# Patient Record
Sex: FEMALE | Race: WHITE
[De-identification: names, ages, dates, MRNs, and addresses within clinical notes are randomized per-mention and may not be internally consistent; named-entity substitution may affect disease eponyms.]

---

## 2020-07-30 ENCOUNTER — HOSPITAL ENCOUNTER (OUTPATIENT)
Dept: HOSPITAL 75 - RAD | Age: 20
End: 2020-07-30
Attending: INTERNAL MEDICINE
Payer: COMMERCIAL

## 2020-07-30 DIAGNOSIS — K59.00: ICD-10-CM

## 2020-07-30 DIAGNOSIS — R10.9: ICD-10-CM

## 2020-07-30 DIAGNOSIS — R19.7: Primary | ICD-10-CM

## 2020-07-30 PROCEDURE — 76705 ECHO EXAM OF ABDOMEN: CPT

## 2021-12-02 ENCOUNTER — HOSPITAL ENCOUNTER (OUTPATIENT)
Dept: HOSPITAL 75 - RAD | Age: 21
End: 2021-12-02
Attending: INTERNAL MEDICINE
Payer: COMMERCIAL

## 2021-12-02 DIAGNOSIS — E04.1: Primary | ICD-10-CM

## 2021-12-02 PROCEDURE — 76536 US EXAM OF HEAD AND NECK: CPT

## 2021-12-02 NOTE — DIAGNOSTIC IMAGING REPORT
PROCEDURE: US Thyroid.



TECHNIQUE: Multiple real-time grayscale images were obtained of

the thyroid in various projections.



INDICATION: Thyroid nodule.



FINDINGS: Right lobe of the thyroid measures 3.6 x 1.1 x 0.8 cm,

and left lobe measures 3.8 x 0.9 x 1.2 cm. Isthmus is 2 mm in

thickness. Both lobes of the thyroid show some parenchymal

heterogeneity, but no discrete thyroid mass is identified.



IMPRESSION: Thyroid heterogeneity. No thyroid mass is detected.



Dictated by: 



  Dictated on workstation # [id number]

## 2021-12-15 ENCOUNTER — HOSPITAL ENCOUNTER (OUTPATIENT)
Dept: HOSPITAL 75 - PREOP | Age: 21
Discharge: HOME | End: 2021-12-15
Attending: SURGERY
Payer: COMMERCIAL

## 2021-12-15 VITALS — HEIGHT: 67.01 IN | BODY MASS INDEX: 29.34 KG/M2 | WEIGHT: 186.95 LBS

## 2021-12-15 DIAGNOSIS — Z01.818: Primary | ICD-10-CM

## 2021-12-17 ENCOUNTER — HOSPITAL ENCOUNTER (OUTPATIENT)
Dept: HOSPITAL 75 - ENDO | Age: 21
End: 2021-12-17
Attending: SURGERY
Payer: COMMERCIAL

## 2021-12-17 VITALS — SYSTOLIC BLOOD PRESSURE: 136 MMHG | DIASTOLIC BLOOD PRESSURE: 78 MMHG

## 2021-12-17 VITALS — BODY MASS INDEX: 29.34 KG/M2 | WEIGHT: 186.95 LBS | HEIGHT: 67.01 IN

## 2021-12-17 VITALS — SYSTOLIC BLOOD PRESSURE: 144 MMHG | DIASTOLIC BLOOD PRESSURE: 89 MMHG

## 2021-12-17 VITALS — DIASTOLIC BLOOD PRESSURE: 96 MMHG | SYSTOLIC BLOOD PRESSURE: 159 MMHG

## 2021-12-17 VITALS — DIASTOLIC BLOOD PRESSURE: 86 MMHG | SYSTOLIC BLOOD PRESSURE: 135 MMHG

## 2021-12-17 VITALS — SYSTOLIC BLOOD PRESSURE: 139 MMHG | DIASTOLIC BLOOD PRESSURE: 88 MMHG

## 2021-12-17 DIAGNOSIS — K44.9: ICD-10-CM

## 2021-12-17 DIAGNOSIS — Z79.899: ICD-10-CM

## 2021-12-17 DIAGNOSIS — K29.40: Primary | ICD-10-CM

## 2021-12-17 DIAGNOSIS — K25.9: ICD-10-CM

## 2021-12-17 DIAGNOSIS — K21.00: ICD-10-CM

## 2021-12-17 PROCEDURE — 84703 CHORIONIC GONADOTROPIN ASSAY: CPT

## 2021-12-17 NOTE — DISCHARGE INST-SIMPLE/STANDARD
Discharge Inst-Standard


Patient Instructions/Follow Up


Plan of Care/Instructions/FU:  


2 weeks chantelle


Activity as Tolerated:  Yes


Discharge Diet:  Regular Diet











KRISTIN HOWARD DO              Dec 17, 2021 15:45

## 2021-12-17 NOTE — OPERATIVE REPORT
DATE OF SERVICE:  12/17/2021



PREOPERATIVE DIAGNOSIS:

Chronic gastritis.



POSTOPERATIVE DIAGNOSES:

Slight gastritis in the antrum, small hiatal hernia.



PROCEDURE:

EGD with biopsy.



SURGEON:

Kristin Barr DO



ANESTHESIA:

Per CRNA.



ESTIMATED BLOOD LOSS:

None.



COMPLICATIONS:

None.



SPECIMENS:

Antrum, GE junction.



INDICATIONS:

The patient is a 21-year-old female with chronic gastritis symptoms.  She has

history of atrophic gastritis.  She understands risks and benefits of procedure

and wishes to proceed.  Consent was signed in the chart.



DESCRIPTION OF PROCEDURE:

The patient was taken to the endoscopy suite, placed in left lateral recumbent

position.  Timeout was performed.  Scope was inserted in mouth, down the

esophagus, stomach and into the duodenum without difficulty.  There were no

polyps, masses or ulcerations within the duodenum.  Scope was slowly retracted

back into the stomach where it was further insufflated.  Patchy areas of

gastritis appearance in the antrum.  Biopsies were obtained.  Scope was

retroflexed noting a small hiatal hernia, no other pathology.  Scope was

returned to its normal position, slowly withdrawn to distal esophagus.  No

polyps, masses or ulcerations.  Biopsy of the GE junction was obtained.  Scope

was slowly retracted back until completely removed, noting no other pathology. 

The patient tolerated procedure well without any complications.  She was taken

to recovery room in stable condition.



RECOMMENDATIONS:

Continue on current medications.  She will follow up in office in two weeks to

discuss pathology results.  Likely repeat EGD in one year from her previous

recommendations from her previous gastroenterologist.





Job ID: [id number]

DocumentID: [id number]

Dictated Date:  12/17/2021 15:35:56

Transcription Date: 12/17/2021 20:45:05

Dictated By: KRISTIN BARR DO

## 2021-12-17 NOTE — PROGRESS NOTE-POST OPERATIVE
Post-Operative Progess Note


Surgeon (s)/Assistant (s)


Surgeon


KRISTIN HOWARD DO


Assistant:  na





Pre-Operative Diagnosis


chronic gastritis





Post-Operative Diagnosis





gastritis changes of antrum, small hiatal hernia





Procedure & Operative Findings


Date of Procedure


12/17/21


Procedure Performed/Findings


egd c biopsies


Anesthesia Type


per crna





Estimated Blood Loss


Estimated blood loss (mL):  none





Specimens/Packing


Specimens Removed


antrum, ge











KRISTIN HOWARD DO              Dec 17, 2021 15:09

## 2021-12-17 NOTE — PROGRESS NOTE-PRE OPERATIVE
Pre-Operative Progress Note


H&P Reviewed


The H&P was reviewed, patient examined and no changes noted.


Date Seen by Provider:  Dec 17, 2021


Time Seen by Provider:  14:48


Date H&P Reviewed:  Dec 17, 2021


Time H&P Reviewed:  14:48


Pre-Operative Diagnosis:  chronic gastritis











KRISTIN HOWARD DO              Dec 17, 2021 14:49

## 2021-12-17 NOTE — XMS REPORT
Clinical Summary

                             Created on: 2021



Jaclyn Ecsobedo

External Reference #: [id number]

: 2000

Sex: Female



Demographics





                          Address                   [address] [place]  [address]

 

                          Home Phone                [phone]

 

                          Preferred Language        Unknown

 

                          Marital Status            Unknown

 

                          Cheondoism Affiliation     Unknown

 

                          Race                      Unknown

 

                          Ethnic Group              Unknown





Author





                          Author                    [facility]

 

                          Organization              [facility]

 

                          Address                   Unknown

 

                          Phone                     Unavailable







Care Team Providers





                    Care Team Member Name Role                Phone

 

                    Jessy Martínez MD PCP                 [phone]







Allergies

No known active allergies



Medications





                          End Date                  Status



              Medication   Sig          Dispensed    Refills      Start  



                                         Date  

 

                                                    Active



                     ibuprofen (ADVIL,MOTRIN)    0                     



                           800 MG tablet             8  

 

                                                    Active



                     LO LOESTRIN FE 1 mg-10    0                     



                           mcg (24)/10 mcg (2) tab     8  

 

                                                    Active



                     NP THYROID 90 mg tab    0                     



                                         8  







Active Problems





Not on file



Encounters

Not on filefrom Last 3 Months



Immunizations

Not on file



Social History





                                        Date



                 Tobacco Use     Types           Packs/Day       Years Used 

 

                                         



                                         Never Smoker    

 

    



                                         Smokeless Tobacco: Never   



                                         Used   







                                        Tobacco Cessation: Counseling Given: Yes









 



                           Sex Assigned at Birth     Date Recorded

 

 



                                         Not on file 







Last Filed Vital Signs





                    Reading             Time Taken          Comments



                                         Vital Sign   

 

                    120/78              2018 11:36 AM CDT  



                                         Blood Pressure   

 

                    82                  2018 11:36 AM CDT  



                                         Pulse   

 

                    37.1 C (98.7 F) 2018 11:36 AM CDT  



                                         Temperature   

 

                    16                  2018 11:36 AM CDT  



                                         Respiratory Rate   

 

                    98%                 2018 11:36 AM CDT  



                                         Oxygen Saturation   

 

                    -                   -                    



                                         Inhaled Oxygen   



                                         Concentration   

 

                    83.4 kg (183 lb 13.8 oz) 2018 11:36 AM CDT  



                                         Weight   

 

                    167.6 cm (5' 6")    2018 11:36 AM CDT  



                                         Height   

 

                    29.68               2018 11:36 AM CDT  



                                         Body Mass Index   







Plan of Treatment





   



                 Health Maintenance  Due Date        Last Done       Comments

 

   



                           Cervical Cancer:          2019  



                                         Screening   







Goals

Not on file



Implants

Not on file



Procedures

Not on filefrom Last 3 Months



Results

Not on filefrom Last 3 Months



Additional Health Concerns

Not on file



Insurance





                                        Type



            Payer      Benefit    Subscriber ID  Effective  Phone      Address 



                           Plan /                    Dates   



                                         Group     

 

                                         



                 [facility]             1               [id number]       2018-P   



                           [employer]                resent   



                                         [facility] [place]     



                                         [id number]     







     



            Guarantor Name  Account    Relation to  Date of    Phone      Billin

g Address



                     Type                Patient             Birth  

 

     



            Jaclyn Escobedo  Personal/F  Self       2000  [phone]  1

[address]               (Home)              [place] [id number]







Care Teams





                          Start Date                End Date



                     Team Member         Relationship        Specialty  

 

                          18                    



                     Jessy Martínez MD  PCP - General       Family  



                           [address]            Medicine  



                                         [place] [id number] [phone] (Work)    



                                         [phone] (Fax)

## 2021-12-23 NOTE — ANESTHESIA-GENERAL POST-OP
MAC


Significant Intra-Op Events


Notes


late entry 12/17





Patient Condition


Mental Status/LOC:  Same as Preop


Cardiovascular:  Satisfactory


Nausea/Vomiting:  Absent


Respiratory:  Satisfactory


Pain:  Controlled


Complications:  Absent





Post Op Complications


Complications


None





Follow Up Care/Instructions


Patient Instructions


None needed.





Anesthesiology Discharge Order


Discharge Order


Patient is doing well, no complaints, stable vital signs, no apparent adverse 

anesthesia problems.   


No complications reported per nursing.











MATILDE MCDONALD CRNA            Dec 23, 2021 13:27

## 2022-01-18 ENCOUNTER — HOSPITAL ENCOUNTER (OUTPATIENT)
Dept: HOSPITAL 75 - RAD | Age: 22
End: 2022-01-18
Attending: SURGERY
Payer: COMMERCIAL

## 2022-01-18 DIAGNOSIS — R10.13: Primary | ICD-10-CM

## 2022-01-18 PROCEDURE — 76705 ECHO EXAM OF ABDOMEN: CPT

## 2022-01-18 NOTE — DIAGNOSTIC IMAGING REPORT
PROCEDURE: US Gallbladder.



TECHNIQUE: Multiple real-time grayscale images were obtained over

the right upper quadrant in various projections.



INDICATION:   Epigastric pain.



Comparison with previous exam 07/30/2020.



FINDINGS: Liver parenchyma appears normal measuring 16 cm. Bile

ducts are not dilated. Common duct measures 4 mm. Gallbladder

appears normal without evidence of gallstones or wall thickening.

The pancreas is normal. There is no pericholecystic edema. Aorta

measures 1.7 cm. Vena cava and portal vein appear normal with

Doppler sampling. Right kidney measures 10 x 5.8 x 5.6 cm. There

is no hydronephrosis. There is no ascites. Negative Zavaleta's

sign.



IMPRESSION: Normal right upper quadrant ultrasound.



Dictated by: 



  Dictated on workstation # [id number]

## 2022-01-27 ENCOUNTER — HOSPITAL ENCOUNTER (OUTPATIENT)
Dept: HOSPITAL 75 - CARD | Age: 22
End: 2022-01-27
Attending: SURGERY
Payer: COMMERCIAL

## 2022-01-27 DIAGNOSIS — R10.13: Primary | ICD-10-CM

## 2022-01-27 PROCEDURE — 78227 HEPATOBIL SYST IMAGE W/DRUG: CPT

## 2022-01-27 NOTE — DIAGNOSTIC IMAGING REPORT
Clinical indication: Patient with epigastric pain.



Comparison: Right upper quadrant ultrasound dated 01/18/2022.



Procedure: The patient was administered 4.78 millicuries of

technetium 99m mebrofenin. After 60 minutes of the images, one

can of Ensure was drink followed by another 60 minutes of

imaging.  A nuclear medicine hepatobiliary scan with ejection

fraction was performed.



Findings: There is prompt uptake and excretion of radiotracer by

the liver. Activity is visible in the gallbladder by 15 minutes

and the small bowel by 35 minutes. Ejection fraction of the

gallbladder is calculated at 68% (normal >33%). The gallbladder

visibly empties on the scans following the ingestion of Ensure.



Impression: Normal hepatobiliary scan with normal gallbladder

ejection fraction.



Dictated by: 



  Dictated on workstation # [id number]

## 2022-03-28 ENCOUNTER — HOSPITAL ENCOUNTER (EMERGENCY)
Dept: HOSPITAL 75 - ER | Age: 22
Discharge: HOME | End: 2022-03-28
Payer: COMMERCIAL

## 2022-03-28 VITALS — SYSTOLIC BLOOD PRESSURE: 141 MMHG | DIASTOLIC BLOOD PRESSURE: 90 MMHG

## 2022-03-28 VITALS — WEIGHT: 182.98 LBS | BODY MASS INDEX: 28.72 KG/M2 | HEIGHT: 66.93 IN
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
## 2022-03-28 DIAGNOSIS — S50.11XA: ICD-10-CM
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
## 2022-03-28 DIAGNOSIS — S50.01XA: Primary | ICD-10-CM
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
## 2022-03-28 PROCEDURE — 73090 X-RAY EXAM OF FOREARM: CPT

## 2022-03-28 PROCEDURE — 73080 X-RAY EXAM OF ELBOW: CPT

## 2022-03-28 NOTE — ED UPPER EXTREMITY
General


Chief Complaint:  Upper Extremity


Stated Complaint:  HIT WITH BALL


Nursing Triage Note:  


PT AMB TO [facility] PT CO OF R ELBOW AND SHOULDER PAIN, PT WAS HIT W SOFTBALL IN ELBOW




FROM SHORT DISTANCE. PT CO OF PAIN, TINGLING THAT HURTS UP TO SHOULDER RATES 


[id number]. PT WEARING SLING


Source:  patient


Exam Limitations:  no limitations





History of Present Illness


Date Seen by Provider:  Mar 28, 2022


Time Seen by Provider:  18:34


Initial Comments


Patient is a 21-year-old female who presents ED with right forearm and elbow 

pain.  Patient states 1 hour ago she took a softball right to the elbow and 

forearm.  She states she heard a pop.  Had immediate swelling and pain.  Pain 

with any movement of the right elbow and wrist.  No obvious bone deformity.  

Swelling and bruising.  Denies taking thing for pain.  Patient was placed in 

sling at practice.  Denies distal numbness and tingling, shoulder pain, chest 

pain, shortness of breath, cough, fever





Allergies and Home Medications


Allergies


Coded Allergies:  


     No Known Drug Allergies (Unverified , 12/9/21)





Patient Home Medication List


Home Medication List Reviewed:  Yes


Hydrocodone/Acetaminophen (Hydrocodone-Acetamin 5-325 mg) 1 Each Tablet, 1 TAB 

PO Q4H PRN for PAIN-MODERATE (5-7)


   Prescribed by: GOLDIE FIELDS on 3/28/22 1917


Ibuprofen (Ibuprofen) 800 Mg Tablet, 800 MG PO Q8H PRN for PAIN


   Prescribed by: GOLDIE FIELDS on 3/28/22 1917


Levothyroxine Sodium (Levothyroxine) 137 Mcg Capsule, 137 MCG PO DAILY, 

(Reported)


   Entered as Reported by: NABIL SINGH on 12/9/21 1501


Norethindrone AC-Eth Estradiol (Loestrin 21 1-20 Tablet) 1 Each Tablet, 1 EACH 

PO DAILY, (Reported)


   Entered as Reported by: NABIL SINGH on 12/9/21 1501


Pantoprazole Sodium (Pantoprazole Sodium) 40 Mg Tablet.dr, 40 MG PO DAILY, 

(Reported)


   Entered as Reported by: NABIL SINGH on 12/9/21 1501





Review of Systems


Constitutional:  No chills, No diaphoresis, No dizziness, No fever


EENTM:  No blurred vision, No vision loss


Respiratory:  No cough, No dyspnea on exertion


Cardiovascular:  No chest pain, No edema


Gastrointestinal:  No abdominal pain, No diarrhea, No nausea, No vomiting


Genitourinary:  No decreased output, No discharge


Musculoskeletal:  No back pain; muscle pain, muscle stiffness


Skin:  change in color





All Other Systems Reviewed


Negative Unless Noted:  Yes





Past Medical-Social-Family Hx


Patient Social History


Tobacco Use?:  No


Substance use?:  No


Alcohol Use?:  No


Pt feels they are or have been:  No





Immunizations Up To Date


Influenza Vaccine Up-to-Date:  Yes; Up-to-Date


First/Initial COVID19 Vaccinat:  2021


Second COVID19 Vaccination Madhu:  2021


Third COVID19 Vaccination Date:  YES


COVID19 Vaccine :  MODERNA





Seasonal Allergies


Seasonal Allergies:  No





Past Medical History


Surgeries:  Yes (R SHOULDER/SEPTIC HIP/TOOTH IMPLANT/BILAT TENDON CUT)


Orthopedic


Respiratory:  No


Cardiac:  No


Neurological:  No


Genitourinary:  No


Gastrointestinal:  Yes (ATROPHIC GASTRITIS)


Musculoskeletal:  No


Endocrine:  Yes


Hypothyroidsim


HEENT:  No


Cancer:  No


Psychosocial:  No


Blood Disorders:  No





Physical Exam


Vital Signs





Vital Signs - First Documented








 3/28/22





 18:15


 


Temp 35.9


 


Pulse 91


 


Resp 18


 


B/P (MAP) 141/90 (107)


 


Pulse Ox 100





Capillary Refill : Less Than 3 Seconds


Height, Weight, BMI


Height: '"


Weight: lbs. oz. kg; 28.00 BMI


Method:


General Appearance:  WD/WN, no apparent distress


HEENT:  PERRL/EOMI, normal ENT inspection, TMs normal, pharynx normal


Neck:  non-tender, full range of motion, supple, normal inspection


Cardiovascular:  regular rate, rhythm, no edema, no gallop


Respiratory:  chest non-tender, lungs clear, normal breath sounds


Gastrointestinal:  normal bowel sounds, non tender, soft


Back:  normal inspection, no CVA tenderness


Shoulder:  normal inspection, non-tender, no evidence of injury


Elbow/Forearm:  Right, limited ROM, swelling


Wrist:  Yes pain, Yes soft tissue tenderness


Hand:  normal inspection, non-tender, normal ROM, Right


Neurologic/Psychiatric:  CNs II-XII nml as tested, no motor/sensory deficits, 

alert, normal mood/affect, oriented x 3


Skin:  other (Swelling of the right proximal lateral forearm.)





Progress/Results/Core Measures


Results/Orders


My Orders





Orders - RAMSES MENDEZ


Elbow, Right, 3 Views (3/28/22 18:35)


Forearm, Right, 2 Views (3/28/22 18:35)


Hydrocodone/Apap 5/325 Tablet (Lortab 5 (3/28/22 18:45)


Hydrocodone/Apap 5/325 Tablet (Lortab 5 (3/28/22 18:50)





Medications Given in ED





Current Medications








 Medications  Dose


 Ordered  Sig/Piero


 Route  Start Time


 Stop Time Status Last Admin


Dose Admin


 


 Acetaminophen/


 Hydrocodone Bitart  1 ea  ONCE  ONCE


 PO  3/28/22 18:45


 3/28/22 18:49 DC 3/28/22 18:51


1 EA








Vital Signs/I&O











 3/28/22





 18:15


 


Temp 35.9


 


Pulse 91


 


Resp 18


 


B/P (MAP) 141/90 (107)


 


Pulse Ox 100














Blood Pressure Mean:                    107











Departure


Communication (PCP)


X-ray was negative for fracture.  Limited movement secondary to the pain.  Does 

have an area of swelling and bruising to the right proximal forearm right 

lateral elbow.  Muscle bone contusion.  If worsening pain and patient will need 

to mara-ray in 7 to 10 days.  She does have a orthopedic who she will follow up 

with.  Will discharge with pain medication anti-inflammatories.  Recommend ice 

for the next 3 to 4 days.  Ace wrap for support.  Return precautions were 

discussed.





Impression





   Primary Impression:  


   Contusion of elbow


Disposition:  01 HOME, SELF-CARE


Condition:  Stable





Departure-Patient Inst.


Decision time for Depature:  19:16


Referrals:  


NO,LOCAL PHYSICIAN (PCP)


Primary Care Physician








SARAH VARGAS MD


Patient Instructions:  How to Use a Shoulder Sling, Contusion (DC)


Scripts


Ibuprofen (Ibuprofen) 800 Mg Tablet


800 MG PO Q8H PRN for PAIN, #20 TAB


   Prov: RAMSES MENDEZ         3/28/22 


Hydrocodone/Acetaminophen (Hydrocodone-Acetamin 5-325 mg) 1 Each Tablet


1 TAB PO Q4H PRN for PAIN-MODERATE (5-7), #14 TAB


   Prov: RAMSES MENDEZ         3/28/22











RAMSES MENDEZ          Mar 28, 2022 18:35

## 2022-03-28 NOTE — DIAGNOSTIC IMAGING REPORT
CLINICAL HISTORY: Arm pain. Softball injury.



COMPARISON: None.



TECHNIQUE: 2 views of the right forearm.



FINDINGS: 

There is no acute fracture or dislocation of the right radius and

ulna. Alignment is anatomic.  The imaged joint spaces are

preserved.  



IMPRESSION: 

1. No acute fracture or dislocation in the right radius and ulna.



Dictated by: 



  Dictated on workstation # [id number]

## 2022-03-28 NOTE — DIAGNOSTIC IMAGING REPORT
CLINICAL HISTORY: Right elbow pain.



COMPARISON: None.



TECHNIQUE: Three views of the right elbow.



FINDINGS:

There is no acute fracture or dislocation of the right elbow.

Alignment is anatomic. The imaged joint spaces are preserved. No

joint effusion is seen in the right elbow. 



IMPRESSION:

1. No acute fracture or dislocation in the right elbow.



Dictated by: 



  Dictated on workstation # [id number] 04-Jul-2020 23:26

## 2022-07-07 NOTE — DIAGNOSTIC IMAGING REPORT
EXAMINATION: US Abdomen limited.



TECHNIQUE: Multiple real-time grayscale images were obtained over

the right upper quadrant in various projections.



HISTORY: Abdominal pain



COMPARISON: None available.



FINDINGS: 



The liver is normal in size. The liver is normal in echogenicity.

No focal lesions are seen. The portal vein is patent with

hepatopedal flow. Gallbladder is normal without wall thickening

or pericholecystic fluid. Sonographic Zavaleta sign is negative.

Common duct measures 4 mm. There is no biliary ductal dilation.

The visualized portions of the pancreas are normal. Right kidney

appears normal. 



IMPRESSION:



1. Unremarkable right upper quadrant ultrasound.



Dictated by: 



  Dictated on workstation # [id number] To CT